# Patient Record
Sex: FEMALE | Race: WHITE | NOT HISPANIC OR LATINO
[De-identification: names, ages, dates, MRNs, and addresses within clinical notes are randomized per-mention and may not be internally consistent; named-entity substitution may affect disease eponyms.]

---

## 2019-05-07 ENCOUNTER — RESULT REVIEW (OUTPATIENT)
Age: 39
End: 2019-05-07

## 2020-10-19 ENCOUNTER — TRANSCRIPTION ENCOUNTER (OUTPATIENT)
Age: 40
End: 2020-10-19

## 2021-02-20 ENCOUNTER — OUTPATIENT (OUTPATIENT)
Dept: OUTPATIENT SERVICES | Facility: HOSPITAL | Age: 41
LOS: 1 days | Discharge: HOME | End: 2021-02-20

## 2021-02-20 DIAGNOSIS — Z90.49 ACQUIRED ABSENCE OF OTHER SPECIFIED PARTS OF DIGESTIVE TRACT: Chronic | ICD-10-CM

## 2021-02-20 DIAGNOSIS — E61.1 IRON DEFICIENCY: ICD-10-CM

## 2021-02-20 DIAGNOSIS — Z98.89 OTHER SPECIFIED POSTPROCEDURAL STATES: Chronic | ICD-10-CM

## 2021-02-23 ENCOUNTER — APPOINTMENT (OUTPATIENT)
Dept: HEMATOLOGY ONCOLOGY | Facility: CLINIC | Age: 41
End: 2021-02-23
Payer: COMMERCIAL

## 2021-02-23 ENCOUNTER — RESULT REVIEW (OUTPATIENT)
Age: 41
End: 2021-02-23

## 2021-02-23 ENCOUNTER — TRANSCRIPTION ENCOUNTER (OUTPATIENT)
Age: 41
End: 2021-02-23

## 2021-02-23 VITALS
TEMPERATURE: 97.1 F | WEIGHT: 193.54 LBS | OXYGEN SATURATION: 95 % | BODY MASS INDEX: 30.02 KG/M2 | HEIGHT: 67.32 IN | DIASTOLIC BLOOD PRESSURE: 88 MMHG | RESPIRATION RATE: 16 BRPM | HEART RATE: 94 BPM | SYSTOLIC BLOOD PRESSURE: 142 MMHG

## 2021-02-23 DIAGNOSIS — E61.1 IRON DEFICIENCY: ICD-10-CM

## 2021-02-23 DIAGNOSIS — Z83.2 FAMILY HISTORY OF DISEASES OF THE BLOOD AND BLOOD-FORMING ORGANS AND CERTAIN DISORDERS INVOLVING THE IMMUNE MECHANISM: ICD-10-CM

## 2021-02-23 DIAGNOSIS — K20.0 EOSINOPHILIC ESOPHAGITIS: ICD-10-CM

## 2021-02-23 LAB
BASOPHILS # BLD AUTO: 0.05 K/UL — SIGNIFICANT CHANGE UP (ref 0–0.2)
BASOPHILS NFR BLD AUTO: 0.6 % — SIGNIFICANT CHANGE UP (ref 0–2)
EOSINOPHIL # BLD AUTO: 0.3 K/UL — SIGNIFICANT CHANGE UP (ref 0–0.5)
EOSINOPHIL NFR BLD AUTO: 3.4 % — SIGNIFICANT CHANGE UP (ref 0–6)
HCT VFR BLD CALC: 37.1 % — SIGNIFICANT CHANGE UP (ref 34.5–45)
HGB BLD-MCNC: 13 G/DL — SIGNIFICANT CHANGE UP (ref 11.5–15.5)
IMM GRANULOCYTES NFR BLD AUTO: 0.3 % — SIGNIFICANT CHANGE UP (ref 0–1.5)
LYMPHOCYTES # BLD AUTO: 2.13 K/UL — SIGNIFICANT CHANGE UP (ref 1–3.3)
LYMPHOCYTES # BLD AUTO: 24 % — SIGNIFICANT CHANGE UP (ref 13–44)
MCHC RBC-ENTMCNC: 31.3 PG — SIGNIFICANT CHANGE UP (ref 27–34)
MCHC RBC-ENTMCNC: 35 G/DL — SIGNIFICANT CHANGE UP (ref 32–36)
MCV RBC AUTO: 89.4 FL — SIGNIFICANT CHANGE UP (ref 80–100)
MONOCYTES # BLD AUTO: 0.6 K/UL — SIGNIFICANT CHANGE UP (ref 0–0.9)
MONOCYTES NFR BLD AUTO: 6.8 % — SIGNIFICANT CHANGE UP (ref 2–14)
NEUTROPHILS # BLD AUTO: 5.77 K/UL — SIGNIFICANT CHANGE UP (ref 1.8–7.4)
NEUTROPHILS NFR BLD AUTO: 64.9 % — SIGNIFICANT CHANGE UP (ref 43–77)
NRBC # BLD: 0 /100 WBCS — SIGNIFICANT CHANGE UP (ref 0–0)
PLATELET # BLD AUTO: 332 K/UL — SIGNIFICANT CHANGE UP (ref 150–400)
RBC # BLD: 4.15 M/UL — SIGNIFICANT CHANGE UP (ref 3.8–5.2)
RBC # FLD: 13.2 % — SIGNIFICANT CHANGE UP (ref 10.3–14.5)
WBC # BLD: 8.88 K/UL — SIGNIFICANT CHANGE UP (ref 3.8–10.5)
WBC # FLD AUTO: 8.88 K/UL — SIGNIFICANT CHANGE UP (ref 3.8–10.5)

## 2021-02-23 PROCEDURE — 99072 ADDL SUPL MATRL&STAF TM PHE: CPT

## 2021-02-23 PROCEDURE — 99204 OFFICE O/P NEW MOD 45 MIN: CPT

## 2021-02-24 PROBLEM — Z83.2 FAMILY HISTORY OF ANEMIA: Status: ACTIVE | Noted: 2021-02-24

## 2021-02-24 PROBLEM — E61.1 IRON DEFICIENCY: Status: ACTIVE | Noted: 2021-02-23

## 2021-02-24 PROBLEM — K20.0 EOSINOPHILIC ESOPHAGITIS: Status: ACTIVE | Noted: 2021-02-24

## 2021-03-01 NOTE — ASSESSMENT
[FreeTextEntry1] : 40 year old woman with eosinophilic esophagitis here for iron deficiency without anemia.  HGB 13.0/ HCT 37.1 today.  \par \par Plan:\par Check iron, TIBC, Ferritin\par Increase Ferrous bisglycinate to BID\par Follow-up with sleep study as planned. \par Case and management discussed with Dr. Meir Mason. \par \par \par Attending attestation\par This is a 40 year old woman with a history of eosinophilic esophagitis.  Patient had a Hg 13g/dl.  Ferritin of 5ng/ml.  Explained to patient that the iron deficiency is mild enough such that it had not yet impacted her hemoglobin.  Recommend she continue her iron supplements twice a day as long as it does not produce any GI symptoms of nausea, vomiting or diarrhea.  It is unlikely that her iron deficiency is what is producing her symptoms. Recommend \par Can check her iron, TIBC and ferritin again to see if her iron storage had improved with the use of the iron supplements.  \par \par Addendum 3/1/21 \par Called patient, to inform her that Hg 13.0g/dl normal, and ferritin 43mg/dl normal, but is slightly on the lower side.  Reccomended patient continue the iron supplements she has twice a day. No answer left a VM.

## 2021-03-01 NOTE — HISTORY OF PRESENT ILLNESS
[de-identified] : Initial Consultation 2021\par Referred by self\par CC: Iron deficiency\par \par 40 year old woman with eosinophilic esophagitis here for evaluation of iron deficiency.  Ferritin 13, Iron saturation 13% and iron 44 on 2020.  Last CBC on record 2020 showed WBC 8.31, HGB 13.0, HCT 40.8, MCV 94.4, and ,000.  Patient reports she has been feeling very tired for over 1 year.  She also c/o feeling mentally foggy and occasional dizziness/lightheadedness intermittently for 2 years.  She also c/o hair thinning, occasional tinnitus, occasional constipation, occasional headaches, decreased libido and feeling cold.  She has occasional dysphagia to solids and liquids due to the eosinophilic esophagitis.  Her menses come regularly, last 4-5 days with heavy menstrual bleeding.  She used to take medicine to lessen menstrual bleeding but stopped over 9 months ago.  She started Ferrous bisglycinate approximately 6 weeks ago; tolerating well except for some mild constipation.  She follows a vegan, gluten free diet.  She denies h/o colonoscopy.  She denies chest pain, SOB, abdominal pain, n/v/d, bloody or black stools.  She is scheduled for sleep study to rule out obstructive sleep apnea to account for some of her symptoms.  \par \par PMHx:\par Eosinophilic esophagitis- diagnosed 13 years ago by endoscopy. Plans to start budesonide treatment soon \par Subclinical Hashimotos thyroiditis\par \par PSHx:\par Caesarean section X 3\par Laparoscopic cholecystectomy \par Tonsillectomy\par \par Hospitalizations:\par Wound infection after \par Infection 2016\par \par Medications:\par See list\par \par Allergies:\par NKDA\par Cats, shellfish, wheat\par \par Social History:\par  with 3 children ( 8 yo son, 6yo daughter, 5yo son)\par Works part-time as a  at [employer]. \par Denies smoking or alcohol use. Born in the [place]par \par Family History:\par Mother alive with breast cancer and angio-immunoblastic T cell lymphoma\par Father alive with HTN, DM, CAD s/p CABG\par Sister alive with mild anemia\par \par Healthcare Maintenance:\par Dr. Niesha Candelaria- PMD- last seen 1 month ago\par Dr. Missy Ricardo- GI\par Last endoscopy  10/2020 showed inflammation and eosinophils were present.  Dilation was done.  Denies h/o colonoscopy\par MRI thyroid- normal (recently done)\par Dr. Janak Lamas (endocrine)\par Last GYN exam 9 months ago\par Last mammogram 1 year ago (normal)\par Received influenza vaccine this season.  Received COVID vaccine. \par \par  [de-identified] : Initial Visit

## 2021-03-01 NOTE — REVIEW OF SYSTEMS
[FreeTextEntry2] : deliberate weight loss 10-12 pounds  [FreeTextEntry4] : occas scant epistaxis  [FreeTextEntry7] : kash constipation [FreeTextEntry8] : heavy menstrual bleeding  [de-identified] : occasional headaches, mental fogginess   supple/no JVD

## 2021-03-05 LAB
FERRITIN SERPL-MCNC: 43 NG/ML
IRON SATN MFR SERPL: 19 %
IRON SERPL-MCNC: 66 UG/DL
TIBC SERPL-MCNC: 350 UG/DL
UIBC SERPL-MCNC: 284 UG/DL

## 2021-05-24 ENCOUNTER — TRANSCRIPTION ENCOUNTER (OUTPATIENT)
Age: 41
End: 2021-05-24

## 2022-10-16 ENCOUNTER — APPOINTMENT (OUTPATIENT)
Dept: ORTHOPEDIC SURGERY | Facility: CLINIC | Age: 42
End: 2022-10-16

## 2022-10-16 VITALS — WEIGHT: 193 LBS | BODY MASS INDEX: 29.94 KG/M2 | HEIGHT: 67.32 IN

## 2022-10-16 DIAGNOSIS — S92.919A UNSPECIFIED FRACTURE OF UNSPECIFIED TOE(S), INITIAL ENCOUNTER FOR CLOSED FRACTURE: ICD-10-CM

## 2022-10-16 DIAGNOSIS — S92.514A NONDISPLACED FRACTURE OF PROXIMAL PHALANX OF RIGHT LESSER TOE(S), INITIAL ENCOUNTER FOR CLOSED FRACTURE: ICD-10-CM

## 2022-10-16 PROCEDURE — 99203 OFFICE O/P NEW LOW 30 MIN: CPT

## 2022-10-16 RX ORDER — DICLOFENAC SODIUM 75 MG/1
75 TABLET, DELAYED RELEASE ORAL
Qty: 20 | Refills: 0 | Status: ACTIVE | COMMUNITY
Start: 2022-10-16 | End: 1900-01-01

## 2022-10-16 NOTE — ASSESSMENT
[FreeTextEntry1] : gisela tape and continued use of postop shoe x 6 weeks.\par RTO in 2 weeks with foot/ankle specialist for repeat xray/examination.

## 2022-10-16 NOTE — IMAGING
[de-identified] : right little toe with swelling and minimal erythema.\par +ttp over the p1 region only.\par all digits are nvi with FAROM.\par There is no pain with ankle rom.\par Gait isminimally antalgic.\par

## 2022-10-16 NOTE — HISTORY OF PRESENT ILLNESS
[9] : 9 [de-identified] : 10/16/2022: right little toe pain s/p striking into a door jam.\par Pt was treated at [facility] and provided a right postop shoe and gisela taped.\par \par PMH: htn.\par Allergies: NKDA.  [FreeTextEntry5] : pt injured rt foot pinky toe last friday states she jammed it , pt states she went to [facility] and got xrays

## 2022-10-16 NOTE — DATA REVIEWED
[Outside X-rays] : outside x-rays 1.79 [Foot] : foot [I independently reviewed and interpreted images and report] : I independently reviewed and interpreted images and report [FreeTextEntry1] : right little toe with minimally displaced P1 medial sided avulsion fracture.

## 2022-10-27 ENCOUNTER — APPOINTMENT (OUTPATIENT)
Dept: ORTHOPEDIC SURGERY | Facility: CLINIC | Age: 42
End: 2022-10-27

## 2022-11-01 ENCOUNTER — APPOINTMENT (OUTPATIENT)
Dept: ORTHOPEDIC SURGERY | Facility: CLINIC | Age: 42
End: 2022-11-01

## 2023-04-16 ENCOUNTER — APPOINTMENT (OUTPATIENT)
Dept: ORTHOPEDIC SURGERY | Facility: CLINIC | Age: 43
End: 2023-04-16
Payer: COMMERCIAL

## 2023-04-16 VITALS — BODY MASS INDEX: 30.29 KG/M2 | WEIGHT: 193 LBS | HEIGHT: 67 IN

## 2023-04-16 PROCEDURE — 99213 OFFICE O/P EST LOW 20 MIN: CPT

## 2023-04-16 PROCEDURE — 72170 X-RAY EXAM OF PELVIS: CPT

## 2023-04-16 PROCEDURE — 72100 X-RAY EXAM L-S SPINE 2/3 VWS: CPT

## 2023-04-16 NOTE — HISTORY OF PRESENT ILLNESS
[Gradual] : gradual [8] : 8 [3] : 3 [Sharp] : sharp [Rest] : rest [Exercising] : exercising [de-identified] : 42 TF with lower back pain for a week or so.  No injury.  Pain worse with running, better with rest and stretching.  No radicular symptoms. [FreeTextEntry5] : pt c.o pain in low back pain for over a week \par pt states she works out in the morning and walking makes the pain worse. \par

## 2023-04-16 NOTE — PHYSICAL EXAM
[NL (90)] : forward flexion 90 degrees [NL (30)] : right lateral rotation 30 degrees [NL (45)] : extension 45 degrees [NL (40)] : left lateral bending 40 degrees [Flexion] : flexion [Extension] : extension [Bending to left] : bending to left [Bending to right] : bending to right [5___] : right extensor hallicus longus 5[unfilled]/5 [Disc space narrowing] : Disc space narrowing [] : non-antalgic [FreeTextEntry1] : L5-S1

## 2023-04-16 NOTE — ASSESSMENT
[FreeTextEntry1] : PT referral\par NSAID's prn\par rest from running\par f/u in 2 weeks with Dr Dutta

## 2023-06-06 ENCOUNTER — APPOINTMENT (OUTPATIENT)
Dept: ORTHOPEDIC SURGERY | Facility: CLINIC | Age: 43
End: 2023-06-06
Payer: COMMERCIAL

## 2023-06-06 DIAGNOSIS — M51.9 UNSPECIFIED THORACIC, THORACOLUMBAR AND LUMBOSACRAL INTERVERTEBRAL DISC DISORDER: ICD-10-CM

## 2023-06-06 DIAGNOSIS — M47.816 SPONDYLOSIS W/OUT MYELOPATHY OR RADICULOPATHY, LUMBAR REGION: ICD-10-CM

## 2023-06-06 DIAGNOSIS — S39.012A STRAIN OF MUSCLE, FASCIA AND TENDON OF LOWER BACK, INITIAL ENCOUNTER: ICD-10-CM

## 2023-06-06 PROCEDURE — 99213 OFFICE O/P EST LOW 20 MIN: CPT

## 2023-06-06 NOTE — ASSESSMENT
[FreeTextEntry1] : L5-S1 DDD, no radic\par c/w PT\par f/u 6 weeks\par consider MRI if no improvement\par \par Patient seen by Viviana Mullins PA-C, with and under the supervision of  Dr. Ignacio Dutta M.D.\par

## 2023-06-06 NOTE — IMAGING
[Disc space narrowing] : Disc space narrowing [AP] : anteroposterior [There are no fractures, subluxations or dislocations. No significant abnormalities are seen] : There are no fractures, subluxations or dislocations. No significant abnormalities are seen [de-identified] : LSPINE\par Inspection: No rash or ecchymosis\par Palpation: Midline lumbosacral tenderness. No tenderness to palpation or spasm in bilateral thoracic and lumbar paraspinal musculature, no SI joint tenderness to palpation\par ROM: Full with stiffness\par Strength: 5/5 bilateral hip flexors, knee extensors, ankle dorsiflexors, EHL, ankle plantarflexors\par Sensation: Sensation present to light touch bilateral L2-S1 distributions\par Provocative maneuvers: Negative bilateral straight leg raise. Tight hamstrings bilaterally\par \par Bilateral hips-\par Palpation: No tenderness to palpation over greater trochanter or IT band\par ROM: No pain with flexion and internal rotation\par  [FreeTextEntry1] : L5-S1 DDD

## 2023-06-06 NOTE — HISTORY OF PRESENT ILLNESS
[Lower back] : lower back [6] : 6 [3] : 3 [Dull/Aching] : dull/aching [Sharp] : sharp [de-identified] : Pt seen by non-spine partners in the past\par \par CATHY Schultz note 4/16/23-"42 TF with lower back pain for a week or so. No injury. Pain worse with running, better with rest and stretching. No radicular symptoms." \par \par \par 6/6/23- Continued LBP without radiation/N/T in BLEs. RXed PT at [facility], with partial relief (has been going X 1 month). Denies b/b dysfunction. \par \par PMH: eosinophilic esophagitis, HTN [] : no [FreeTextEntry5] : No reported injury, pain since April. Patient states the pain is worse when running and improves with stretch. Denies Numbness/Tingling. [de-identified] : CATHY Schultz  [de-identified] : x-ray

## 2023-07-18 ENCOUNTER — APPOINTMENT (OUTPATIENT)
Dept: ORTHOPEDIC SURGERY | Facility: CLINIC | Age: 43
End: 2023-07-18

## 2024-04-04 ENCOUNTER — NON-APPOINTMENT (OUTPATIENT)
Age: 44
End: 2024-04-04